# Patient Record
Sex: MALE | Race: BLACK OR AFRICAN AMERICAN
[De-identification: names, ages, dates, MRNs, and addresses within clinical notes are randomized per-mention and may not be internally consistent; named-entity substitution may affect disease eponyms.]

---

## 2019-07-24 ENCOUNTER — HOSPITAL ENCOUNTER (INPATIENT)
Dept: HOSPITAL 10 - FTE | Age: 24
LOS: 3 days | Discharge: HOME | DRG: 299 | End: 2019-07-27
Payer: MEDICAID

## 2019-07-24 ENCOUNTER — HOSPITAL ENCOUNTER (INPATIENT)
Dept: HOSPITAL 91 - FTE | Age: 24
LOS: 3 days | Discharge: HOME | DRG: 299 | End: 2019-07-27
Payer: MEDICAID

## 2019-07-24 VITALS
HEIGHT: 68 IN | BODY MASS INDEX: 20.65 KG/M2 | WEIGHT: 136.25 LBS | BODY MASS INDEX: 20.65 KG/M2 | HEIGHT: 68 IN | WEIGHT: 136.25 LBS

## 2019-07-24 VITALS — DIASTOLIC BLOOD PRESSURE: 70 MMHG | RESPIRATION RATE: 20 BRPM | SYSTOLIC BLOOD PRESSURE: 132 MMHG

## 2019-07-24 DIAGNOSIS — D68.59: ICD-10-CM

## 2019-07-24 DIAGNOSIS — I82.432: Primary | ICD-10-CM

## 2019-07-24 DIAGNOSIS — J43.9: ICD-10-CM

## 2019-07-24 DIAGNOSIS — I26.99: ICD-10-CM

## 2019-07-24 DIAGNOSIS — F12.90: ICD-10-CM

## 2019-07-24 DIAGNOSIS — I82.442: ICD-10-CM

## 2019-07-24 LAB
ADD MAN DIFF?: NO
ALANINE AMINOTRANSFERASE: 24 IU/L (ref 13–69)
ALBUMIN/GLOBULIN RATIO: 1.17
ALBUMIN: 4.7 G/DL (ref 3.3–4.9)
ALKALINE PHOSPHATASE: 72 IU/L (ref 42–121)
ANION GAP: 8 (ref 5–13)
ASPARTATE AMINO TRANSFERASE: 28 IU/L (ref 15–46)
BASOPHIL #: 0 10^3/UL (ref 0–0.1)
BASOPHILS %: 0.4 % (ref 0–2)
BILIRUBIN,DIRECT: 0 MG/DL (ref 0–0.2)
BILIRUBIN,TOTAL: 0.8 MG/DL (ref 0.2–1.3)
BLOOD UREA NITROGEN: 17 MG/DL (ref 7–20)
CALCIUM: 10.3 MG/DL (ref 8.4–10.2)
CARBON DIOXIDE: 33 MMOL/L (ref 21–31)
CHLORIDE: 99 MMOL/L (ref 97–110)
CREATININE: 1.18 MG/DL (ref 0.61–1.24)
EOSINOPHILS #: 0.1 10^3/UL (ref 0–0.5)
EOSINOPHILS %: 1.6 % (ref 0–7)
GLOBULIN: 4 G/DL (ref 1.3–3.2)
GLUCOSE: 85 MG/DL (ref 70–220)
HEMATOCRIT: 46.1 % (ref 42–52)
HEMOGLOBIN: 15.8 G/DL (ref 14–18)
IMMATURE GRANS #M: 0.02 10^3/UL (ref 0–0.03)
IMMATURE GRANS % (M): 0.3 % (ref 0–0.43)
INR: 1.09
LYMPHOCYTES #: 1.7 10^3/UL (ref 0.8–2.9)
LYMPHOCYTES %: 23.6 % (ref 15–51)
MEAN CORPUSCULAR HEMOGLOBIN: 29.4 PG (ref 29–33)
MEAN CORPUSCULAR HGB CONC: 34.3 G/DL (ref 32–37)
MEAN CORPUSCULAR VOLUME: 85.8 FL (ref 82–101)
MEAN PLATELET VOLUME: 10.7 FL (ref 7.4–10.4)
MONOCYTE #: 0.5 10^3/UL (ref 0.3–0.9)
MONOCYTES %: 7.1 % (ref 0–11)
NEUTROPHIL #: 4.8 10^3/UL (ref 1.6–7.5)
NEUTROPHILS %: 67 % (ref 39–77)
NUCLEATED RED BLOOD CELLS #: 0 10^3/UL (ref 0–0)
NUCLEATED RED BLOOD CELLS%: 0 /100WBC (ref 0–0)
PARTIAL THROMBOPLASTIN TIME: 33.7 SEC (ref 23–35)
PLATELET COUNT: 162 10^3/UL (ref 140–415)
POTASSIUM: 4.1 MMOL/L (ref 3.5–5.1)
PROTIME: 14.2 SEC (ref 11.9–14.9)
PT RATIO: 1.1
RED BLOOD COUNT: 5.37 10^6/UL (ref 4.7–6.1)
RED CELL DISTRIBUTION WIDTH: 12.1 % (ref 11.5–14.5)
SODIUM: 140 MMOL/L (ref 135–144)
TOTAL PROTEIN: 8.7 G/DL (ref 6.1–8.1)
WHITE BLOOD COUNT: 7.1 10^3/UL (ref 4.8–10.8)

## 2019-07-24 PROCEDURE — 81003 URINALYSIS AUTO W/O SCOPE: CPT

## 2019-07-24 PROCEDURE — 80048 BASIC METABOLIC PNL TOTAL CA: CPT

## 2019-07-24 PROCEDURE — 85613 RUSSELL VIPER VENOM DILUTED: CPT

## 2019-07-24 PROCEDURE — 83735 ASSAY OF MAGNESIUM: CPT

## 2019-07-24 PROCEDURE — 83890: CPT

## 2019-07-24 PROCEDURE — 85305 CLOT INHIBIT PROT S TOTAL: CPT

## 2019-07-24 PROCEDURE — 85025 COMPLETE CBC W/AUTO DIFF WBC: CPT

## 2019-07-24 PROCEDURE — 85610 PROTHROMBIN TIME: CPT

## 2019-07-24 PROCEDURE — 84100 ASSAY OF PHOSPHORUS: CPT

## 2019-07-24 PROCEDURE — 85730 THROMBOPLASTIN TIME PARTIAL: CPT

## 2019-07-24 PROCEDURE — 71275 CT ANGIOGRAPHY CHEST: CPT

## 2019-07-24 PROCEDURE — 93971 EXTREMITY STUDY: CPT

## 2019-07-24 PROCEDURE — 85300 ANTITHROMBIN III ACTIVITY: CPT

## 2019-07-24 PROCEDURE — 80053 COMPREHEN METABOLIC PANEL: CPT

## 2019-07-24 PROCEDURE — 85302 CLOT INHIBIT PROT C ANTIGEN: CPT

## 2019-07-24 PROCEDURE — 83036 HEMOGLOBIN GLYCOSYLATED A1C: CPT

## 2019-07-24 PROCEDURE — 84443 ASSAY THYROID STIM HORMONE: CPT

## 2019-07-24 PROCEDURE — G0378 HOSPITAL OBSERVATION PER HR: HCPCS

## 2019-07-24 RX ADMIN — IBUPROFEN 1 MG: 600 TABLET ORAL at 20:41

## 2019-07-24 NOTE — ERD
ER Documentation


Chief Complaint


Chief Complaint





LEFT LEG PAIN X1WK





HPI


This patient is a 24-year-old male with past mental history of DVT and pulmonary


embolism presenting to the emergency department complaining of left calf pain in


termittently for the past 1 week.  Pain is become constant over the past 2 days.


 Patient states the symptoms began after a long travel in a car.  He does not 


recall exactly how long he was in the car, however he does report up to 4 to 5 


hours in a car multiple times over the past 1 week.  He states he has had 4 DVTs


in his left lower extremity in the past and the symptoms feel the same.  He 


denies any shortness of breath, fevers, chills, abdominal pain, dizziness, 


syncope, or other symptoms at this time.  He tried no medication for relief of 


symptoms at home.





ROS


All systems reviewed and are negative except as per history of present illness.





Allergies


Allergies:  


Coded Allergies:  


     No Known Allergy (Unverified , 19)





PMhx/Soc


History of Surgery:  No


Anesthesia Reaction:  No


Hx Neurological Disorder:  No


Hx Respiratory Disorders:  Yes (PE)


Hx Cardiac Disorders:  No


Hx Psychiatric Problems:  No


Hx Miscellaneous Medical Probl:  Yes (DVT)


Hx Alcohol Use:  No


Hx Substance Use:  Yes (MARIJUANA)


Hx Tobacco Use:  Yes


Smoking Status:  Never smoker





FmHx


Family History:  No diabetes





Physical Exam


Vitals





Vital Signs


  Date      Temp  Pulse  Resp  B/P (MAP)   Pulse Ox  O2          O2 Flow    FiO2


Time                                                 Delivery    Rate


   19  98.9    104    19      124/75        97


     19:02                           (91)





Physical Exam


Const:   No acute distress


Head:   Atraumatic 


Eyes:    Normal Conjunctiva


ENT:    Normal External Ears, Nose and Mouth.


Neck:               Full range of motion. No meningismus.


Resp:   Clear to auscultation bilaterally


Cardio:   Regular rate and rhythm, no murmurs


Skin:   No petechiae or rashes


Ext:    No cyanosis, or edema, mild left calf tenderness on palpation.  Strength


and sensation is intact to the left lower extremity.  Difficulty bearing weight 


to the left lower extremity secondary to pain.


Neur:   Awake and alert


Psych:    Normal Mood and Affect


Result Diagram:  


19





Results 24 hrs





Current Medications


 Medications
   Dose
          Sig/Cory
       Start Time
   Status  Last


 (Trade)       Ordered        Route
 PRN     Stop Time              Admin
Dose


                              Reason                                Admin


 Ibuprofen
     600 mg         ONCE  ONCE
    19       DC           19


(Motrin)                      PO
            20:30
                       20:41



                                             19 20:31


 Morphine       4 mg           ONCE  STAT
    19       DC           19


Sulfate
                      IV
            21:10
                       21:24



(morphine)                                   19 21:12


[place] [id number]


                        Radiology Main Line: [phone]





                            DIAGNOSTIC IMAGING REPORT





Patient: ALEJANDRA DELVALLE   : 1995   Age: 24  Sex: M                      


 


       MR #:    [id number]   Acct #:   [id number]    DOS: 19 0000


Ordering MD: SHAR MARQUEZ PA-C   Location:  FTE   Room/Bed:             


                              


                                        


PROCEDURE:   US Lower extremity Venous. 


 


CLINICAL INDICATION:    Left calf pain 


 


TECHNIQUE:   Multiple sonographic images of the bilateral lower extremity deep 


venous system were obtained utilizing grayscale, color-flow, compressive 


sonography and doppler imaging with augmentation.  The images were reviewed on a


PACS workstation. 


 


COMPARISON:   Left 


 


FINDINGS:


There is normal compressibility and flow within the left common femoral or deep 


femoral vein. There is occlusive thrombus in the popliteal vein as well as the 


posterior tibial and peroneal veins.


 


IMPRESSION:


Acute deep venous thrombosis left popliteal, posterior tibial and peroneal 


veins.


_____________________________________________ 


.Chinmay Crystal MD, MD           Date    Time 


Electronically viewed and signed by .Chinmay Crystal MD, MD on 2019 


21:36 


 


D:  2019 21:36  T:  2019 21:36


.A/





CC: SHAR MARQUEZ PA-C





[id number]








Procedures/MDM


This is a pleasant 24-year-old male presenting with complaints of left calf pain


intermittently for the past 1 week.  Patient does have history of DVT and 


pulmonary embolism in the past.  He was nontoxic and well-appearing and in no 


acute distress.  Lung examination was within normal limits.  Patient did have 


left calf tenderness to palpation.  Ultrasound revealed DVT to the left lower 


extremity.  I did discuss patient case with attending ED physician, Dr. Izabel Solis he will commended admission for further treatment including anticoagula


tion therapy.  Patient was directly involved in his medical decision making and 


he agreed with plan for admission.  He remained hemodynamically stable under my 


direct care.





Departure


Diagnosis:  


   Primary Impression:  


   Left leg DVT


   Affected thrombotic vein of extremity:  popliteal  Chronicity:  acute  


   Qualified Codes:  I82.432 - Acute embolism and thrombosis of left popliteal 


   vein


   Additional Impression:  


   Pain of left leg


Condition:  Fair











SHAR MARQUEZ PA-C       2019 20:38

## 2019-07-24 NOTE — HP
Date/Time of Note


Date/Time of Note


DATE: 7/24/19 


TIME: 22:10





Assessment/Plan


VTE Prophylaxis


Pharmacological prophylaxis:  LMWH





Lines/Catheters


IV Catheter Type (from [facility]):  Peripheral IV





Assessment/Plan


Hospital Course


This is a 24-year-old male being admitted to the [facility] floor for:





#1 acute left lower extremity DVT: Patient has a history of hypercoagulable 


state with protein C/protein S and Antithrombin III deficiency.  Previously on 


Xarelto however he was not able to afford it and he also did not like how it 


made him feel.  Will initiate Lovenox therapeutic weight-based dose at the 


current time.  Will consult social work in regards to long-term anticoagulation 


plan.  Protein C/protein S Antithrombin lupus anticoagulant labs drawn prior to 


initiation of Lovenox.  Will consult Dr. Miller of hematology





2.  Shortness of breath: Patient does report occasional shortness of breath.  I 


will also order a CTA of the chest to rule out underlying pulmonary embolus.





3.  Hypercoagulable state: Patient has history of protein C/protein S and 


Antithrombin III deficiency.  Currently on Lovenox.  Will consult Dr. Miller 


with hematology pathology.





4. DVT GI prophylaxis: Therapeutic Lovenox, no GI prophylaxis indicated





Further treatment strategy will be implemented as per the clinical course


Result Diagram:  


7/24/19 2124





Results 24hrs





Laboratory Tests


               Test
                                7/24/19
21:24


               White Blood Count                            7.1


               Red Blood Count                             5.37


               Hemoglobin                                  15.8


               Hematocrit                                  46.1


               Mean Corpuscular Volume                     85.8


               Mean Corpuscular Hemoglobin                 29.4


               Mean Corpuscular Hemoglobin
Concent        34.3  



               Red Cell Distribution Width                 12.1


               Platelet Count                               162


               Mean Platelet Volume                       10.7  H


               Immature Granulocytes %                    0.300


               Neutrophils %                               67.0


               Lymphocytes %                               23.6


               Monocytes %                                  7.1


               Eosinophils %                                1.6


               Basophils %                                  0.4


               Nucleated Red Blood Cells %                  0.0


               Immature Granulocytes #                    0.020


               Neutrophils #                                4.8


               Lymphocytes #                                1.7


               Monocytes #                                  0.5


               Eosinophils #                                0.1


               Basophils #                                  0.0


               Nucleated Red Blood Cells #                  0.0


               Prothrombin Time                            14.2


               Prothrombin Time Ratio                       1.1


               INR International Normalized
Ratio         1.09  



               Activated Partial
Thromboplast Time        33.7  









HPI/ROS


Admit Date/Time


Admit Date/Time


Jul 24, 2019 at 21:21





Hx of Present Illness


Chief complaint: Left lower extremity calf pain x1 week





This is a 21-year-old male with a significant past medical history for multiple 


pulmonary embolism, DVT, protein C, S deficiencies, Antithrombin III deficiency 


Who presented to the emergency department with complaints of left lower 


extremity pain x1 week.  Patient is originally from [place]  But he has moved to


[place].  He states that the pain is been getting worse over the last 2 days.


 H he does report that over the last week or so he has been traveling in the 


car.  I did speak to the patient's mother over the phone who is in [place].  She


does report that the patient has a significant history of clotting which is in 


the family.  The mother has protein C/S and Antithrombin III deficiency as well.


 She is on Pradaxa.  She reports that her eldest son passed away from a heart 


attack secondary to him having blood clots as he also had hypercoagulable state.


 She reports that Nimesh was first diagnosed with clots in 2017.  He was on 


Xarelto for short period of time however secondary to insurance purposes as well


as noncompliance he has not continued on his medications.  Mother is concerned 


regarding his current situation and overall history of clotting.





Allergies: NKDA


Medications: None





ROS


Const: As per HPI


Eyes : No pain discharge or redness or change in visual acuity


ENT: No pain, sore throat, congestion, congestion,  dysphagia or discharge


Respiratory: As per HPI


Cardiovascular: No chest pain, palpitation, PND, or edema


GI : no change in appetite, abdominal pain, nausea, vomiting, diarrhea, 


constipation, or change in the color his stool 


Genitourinary: No dysuria, hematuria, flank pain ,  discharge or CVA tenderness


Musculoskeletal: As per HPI


Skin: No rash, bruising or hives 


Neuro: No headache, dizziness, syncope, seizure, focal weakness


Endocrine: No polyuria, polydipsia, temperature intolerance


Psych: No hallucination, depression, anxiety or suicidal ideation





PMH/Family/Social


Past Medical History


Multiple PEs/DVTs.  Protein C deficiency, protein S deficiency Antithrombin III 


deficiency


Medications





Current Medications


Ondansetron HCl (Zofran Inj) 4 mg BRIDGE ORDER PRN IV NAUSEA/VOMITING;  Start 7/ 24/19 at 21:30;  Stop 7/25/19 at 21:29


Acetaminophen (Tylenol Tab) 650 mg ER BRIDGE PRN PO .MILD PAIN 1-3 OR TEMP;  


Start 7/24/19 at 21:30;  Stop 7/25/19 at 21:29


IV Flush (NS 3 ml) 3 ml PER PROTOCOL IV ;  Start 7/24/19 at 21:30


Ondansetron HCl (Zofran Tab) 4 mg Q6H  PRN PO NAUSEA/VOMITING;  Start 7/24/19 at


21:30


Acetaminophen (Tylenol Tab) 650 mg Q6H  PRN PO .PAIN 1-3 OR TEMP;  Start 7/24/19


at 21:30


Acetaminophen/ Hydrocodone Bitart (Norco (5/325)) 1 tab Q6H  PRN PO .MOD PAIN 4-


6;  Start 7/24/19 at 21:30


Morphine Sulfate (morphine) 2 mg Q4H  PRN IV .SEVERE PAIN 7-10;  Start 7/24/19 


at 21:30


Docusate Sodium (Colace) 100 mg Q12H  PRN PO .CONSTIPATION;  Start 7/24/19 at 


21:30


Bisacodyl (Dulcolax) 5 mg DAILY  PRN PO .CONSTIPATION;  Start 7/24/19 at 21:30


Coded Allergies:  


     No Known Allergy (Unverified , 7/24/19)





Past Surgical History


Past Surgical Hx:  no surgical history





Family History


Significant Family History:  no pertinent family hx





Social History


Alcohol Use:  occasionally


Smoking Status:  Never smoker


Drug Use:  marijuana





Exam/Review of Systems


Vital Signs


Vitals





Vital Signs


  Date      Temp  Pulse  Resp  B/P (MAP)   Pulse Ox  O2          O2 Flow    FiO2


Time                                                 Delivery    Rate


   7/24/19  97.7     68    16      116/77       100  Room Air


     21:34                           (90)








Exam


Exam





General: Patient is a pleasant male currently lying in bed in no acute distress,


he is currently on his phone


HEENT: Atraumatic, normocephalic. The pupils are equal, round and reactive. 


Extraocular motor are intact


Neck: Supple with full range of motion. No rigidity or meningismus


Chest: Nontender


Lungs: Clear to auscultation bilaterally no crackles rales or wheezing


Heart: Normal S1-S2, Regular rhythm and rate. No murmur, S3, or S4


Abdomen: Soft , nontender,  nondistended , bowel sounds are present. No guarding


no rebound tenderness , No masses or organomegaly. No costovertebral temporal 


angle mass


Extremities: Mild calf tenderness palpation over the left lower extremity


Neurologic: Normal mental status, speech normal, cranial nerves II through XII 


are intact, motor and sensory are intact,


Additional Comments


PROCEDURE:   US Lower extremity Venous. 


 


CLINICAL INDICATION:    Left calf pain 


 


TECHNIQUE:   Multiple sonographic images of the bilateral lower extremity deep 


venous system were obtained utilizing grayscale, color-flow, compressive 


sonography and doppler imaging with augmentation.  The images were reviewed on a


PACS workstation. 


 


COMPARISON:   Left 


 


FINDINGS:


There is normal compressibility and flow within the left common femoral or deep 


femoral vein. There is occlusive thrombus in the popliteal vein as well as the 


posterior tibial and peroneal veins.


 


IMPRESSION:


Acute deep venous thrombosis left popliteal, posterior tibial and peroneal 


veins.


_____________________________________________ 


.Chinmay Crystal MD, MD           Date    Time 


Electronically viewed and signed by .Chinmay Crystal MD, MD on 07/24/2019 


21:36 


 


D:  07/24/2019 21:36  T:  07/24/2019 21:36


.A/





CC: SHAR MARQUEZ PA-C





[id number]











MAURIZIO MCNEAL                 Jul 24, 2019 22:10

## 2019-07-25 VITALS — DIASTOLIC BLOOD PRESSURE: 53 MMHG | SYSTOLIC BLOOD PRESSURE: 110 MMHG | HEART RATE: 52 BPM | RESPIRATION RATE: 20 BRPM

## 2019-07-25 VITALS — HEART RATE: 63 BPM | SYSTOLIC BLOOD PRESSURE: 116 MMHG | DIASTOLIC BLOOD PRESSURE: 58 MMHG | RESPIRATION RATE: 18 BRPM

## 2019-07-25 VITALS — SYSTOLIC BLOOD PRESSURE: 127 MMHG | HEART RATE: 75 BPM | DIASTOLIC BLOOD PRESSURE: 68 MMHG | RESPIRATION RATE: 18 BRPM

## 2019-07-25 VITALS — RESPIRATION RATE: 20 BRPM | SYSTOLIC BLOOD PRESSURE: 108 MMHG | DIASTOLIC BLOOD PRESSURE: 75 MMHG

## 2019-07-25 LAB
ADD MAN DIFF?: NO
ADD UMIC: NO
ALANINE AMINOTRANSFERASE: 23 IU/L (ref 13–69)
ALBUMIN/GLOBULIN RATIO: 1.24
ALBUMIN: 4.1 G/DL (ref 3.3–4.9)
ALKALINE PHOSPHATASE: 76 IU/L (ref 42–121)
ANION GAP: 10 (ref 5–13)
ASPARTATE AMINO TRANSFERASE: 33 IU/L (ref 15–46)
BASOPHIL #: 0 10^3/UL (ref 0–0.1)
BASOPHILS %: 0.3 % (ref 0–2)
BILIRUBIN,DIRECT: 0 MG/DL (ref 0–0.2)
BILIRUBIN,TOTAL: 0.6 MG/DL (ref 0.2–1.3)
BLOOD UREA NITROGEN: 19 MG/DL (ref 7–20)
CALCIUM: 9.3 MG/DL (ref 8.4–10.2)
CARBON DIOXIDE: 29 MMOL/L (ref 21–31)
CHLORIDE: 102 MMOL/L (ref 97–110)
CREATININE: 1 MG/DL (ref 0.61–1.24)
EOSINOPHILS #: 0.2 10^3/UL (ref 0–0.5)
EOSINOPHILS %: 2.2 % (ref 0–7)
GLOBULIN: 3.3 G/DL (ref 1.3–3.2)
GLUCOSE: 102 MG/DL (ref 70–220)
HEMATOCRIT: 42.4 % (ref 42–52)
HEMOGLOBIN A1C: 5 % (ref 0–5.9)
HEMOGLOBIN: 14.5 G/DL (ref 14–18)
IMMATURE GRANS #M: 0.02 10^3/UL (ref 0–0.03)
IMMATURE GRANS % (M): 0.3 % (ref 0–0.43)
LYMPHOCYTES #: 3.4 10^3/UL (ref 0.8–2.9)
LYMPHOCYTES %: 43.9 % (ref 15–51)
MAGNESIUM: 2 MG/DL (ref 1.7–2.5)
MEAN CORPUSCULAR HEMOGLOBIN: 29.5 PG (ref 29–33)
MEAN CORPUSCULAR HGB CONC: 34.2 G/DL (ref 32–37)
MEAN CORPUSCULAR VOLUME: 86.4 FL (ref 82–101)
MEAN PLATELET VOLUME: 11.1 FL (ref 7.4–10.4)
MONOCYTE #: 0.6 10^3/UL (ref 0.3–0.9)
MONOCYTES %: 7.3 % (ref 0–11)
NEUTROPHIL #: 3.6 10^3/UL (ref 1.6–7.5)
NEUTROPHILS %: 46 % (ref 39–77)
NUCLEATED RED BLOOD CELLS #: 0 10^3/UL (ref 0–0)
NUCLEATED RED BLOOD CELLS%: 0 /100WBC (ref 0–0)
PLATELET COUNT: 154 10^3/UL (ref 140–415)
POTASSIUM: 3.6 MMOL/L (ref 3.5–5.1)
RED BLOOD COUNT: 4.91 10^6/UL (ref 4.7–6.1)
RED CELL DISTRIBUTION WIDTH: 12.3 % (ref 11.5–14.5)
SODIUM: 141 MMOL/L (ref 135–144)
THYROID STIMULATING HORMONE: 4.21 MIU/L (ref 0.47–4.68)
TOTAL PROTEIN: 7.4 G/DL (ref 6.1–8.1)
UR ASCORBIC ACID: NEGATIVE MG/DL
UR BILIRUBIN (DIP): NEGATIVE MG/DL
UR BLOOD (DIP): NEGATIVE MG/DL
UR CLARITY: CLEAR
UR COLOR: YELLOW
UR GLUCOSE (DIP): NEGATIVE MG/DL
UR KETONES (DIP): NEGATIVE MG/DL
UR LEUKOCYTE ESTERASE (DIP): NEGATIVE LEU/UL
UR NITRITE (DIP): NEGATIVE MG/DL
UR PH (DIP): 6 (ref 5–9)
UR SPECIFIC GRAVITY (DIP): 1.03 (ref 1–1.03)
UR TOTAL PROTEIN (DIP): NEGATIVE MG/DL
UR UROBILINOGEN (DIP): (no result) MG/DL
WHITE BLOOD COUNT: 7.8 10^3/UL (ref 4.8–10.8)

## 2019-07-25 RX ADMIN — ENOXAPARIN SODIUM SCH MG: 100 INJECTION SUBCUTANEOUS at 00:48

## 2019-07-25 RX ADMIN — ENOXAPARIN SODIUM 1 MG: 100 INJECTION SUBCUTANEOUS at 09:11

## 2019-07-25 RX ADMIN — MORPHINE SULFATE 1 MG: 2 INJECTION, SOLUTION INTRAMUSCULAR; INTRAVENOUS at 04:31

## 2019-07-25 RX ADMIN — HYDROCODONE BITARTRATE AND ACETAMINOPHEN PRN TAB: 5; 325 TABLET ORAL at 20:39

## 2019-07-25 RX ADMIN — DOCUSATE SODIUM 1 MG: 100 CAPSULE, LIQUID FILLED ORAL at 23:06

## 2019-07-25 RX ADMIN — ENOXAPARIN SODIUM 1 MG: 100 INJECTION SUBCUTANEOUS at 00:48

## 2019-07-25 RX ADMIN — MORPHINE SULFATE PRN MG: 2 INJECTION, SOLUTION INTRAMUSCULAR; INTRAVENOUS at 04:31

## 2019-07-25 RX ADMIN — ENOXAPARIN SODIUM 1 MG: 100 INJECTION SUBCUTANEOUS at 20:33

## 2019-07-25 RX ADMIN — MORPHINE SULFATE PRN MG: 2 INJECTION, SOLUTION INTRAMUSCULAR; INTRAVENOUS at 09:15

## 2019-07-25 RX ADMIN — HYDROCODONE BITARTRATE AND ACETAMINOPHEN 1 TAB: 5; 325 TABLET ORAL at 01:19

## 2019-07-25 RX ADMIN — ENOXAPARIN SODIUM SCH MG: 100 INJECTION SUBCUTANEOUS at 20:33

## 2019-07-25 RX ADMIN — MORPHINE SULFATE 1 MG: 2 INJECTION, SOLUTION INTRAMUSCULAR; INTRAVENOUS at 14:11

## 2019-07-25 RX ADMIN — ENOXAPARIN SODIUM SCH MG: 100 INJECTION SUBCUTANEOUS at 09:11

## 2019-07-25 RX ADMIN — HYDROCODONE BITARTRATE AND ACETAMINOPHEN 1 TAB: 5; 325 TABLET ORAL at 20:39

## 2019-07-25 RX ADMIN — MORPHINE SULFATE PRN MG: 2 INJECTION, SOLUTION INTRAMUSCULAR; INTRAVENOUS at 14:11

## 2019-07-25 RX ADMIN — MORPHINE SULFATE 1 MG: 2 INJECTION, SOLUTION INTRAMUSCULAR; INTRAVENOUS at 09:15

## 2019-07-25 RX ADMIN — HYDROCODONE BITARTRATE AND ACETAMINOPHEN PRN TAB: 5; 325 TABLET ORAL at 01:19

## 2019-07-25 NOTE — CONS
Assessment/Plan


Assessment/Plan


Assessment/Plan (Daily)


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 


He has been on Xarelto but stopped it


for financial reasons and not because of side effects.  I will see if I can get 


samples for him but in the meantime, he is getting Lovenox.


Case discussed with patient and with mother by phone.  She will send me the lab 


results from his screening done as a teenager.





Consultation Date/Type/Reason


Admit Date/Time


2019 at 21:21


Date of Consultation:  2019


Type of Consult


Hematology


Reason for Consultation


DVT and PE


Requesting Provider:  MAURIZIO MCNEAL


Date/Time of Note


DATE: 19 


TIME: 14:14





Hx of Present Illness


23 yo man with at least four episodes of DVT, the first of which was while he 


was a teenager.  He was diagnosed before the first episode


because his mother had a stroke and was found to have thrombophilic mutations in


.  Also a brother  of an MI at a young age.  The patient 


and the mother both have mutations of Protein S, Protein C and AT III. 





This admission was for left calf area pain found to be a DVT and to be 


associated with PE.





Past Medical History


Medications





Current Medications


IV Flush (NS 3 ml) 3 ml PER PROTOCOL IV ;  Start 19 at 21:30


Ondansetron HCl (Zofran Tab) 4 mg Q6H  PRN PO NAUSEA/VOMITING;  Start 19 at


21:30


Acetaminophen (Tylenol Tab) 650 mg Q6H  PRN PO .PAIN 1-3 OR TEMP;  Start 19


at 21:30


Acetaminophen/ Hydrocodone Bitart (Norco (5/325)) 1 tab Q6H  PRN PO .MOD PAIN 4-


6 Last administered on 19at 01:19; Admin Dose 1 TAB;  Start 19 at 


21:30


Morphine Sulfate (morphine) 2 mg Q4H  PRN IV .SEVERE PAIN 7-10 Last administered


on 19at 14:11; Admin Dose 2 MG;  Start 19 at 21:30


Docusate Sodium (Colace) 100 mg Q12H  PRN PO .CONSTIPATION;  Start 19 at 2


1:30


Bisacodyl (Dulcolax) 5 mg DAILY  PRN PO .CONSTIPATION;  Start 19 at 21:30


Enoxaparin Sodium (Lovenox) 60 mg Q12 SC  Last administered on 19at 09:11; 


Admin Dose 60 MG;  Start 19 at 23:30


Allergies:  


Coded Allergies:  


     No Known Allergy (Unverified , 19)





Past Surgical History


Past Surgical Hx:  no surgical history





Social History


Alcohol Use:  occasionally


Smoking Status:  Never smoker


Drug Use:  marijuana





Exam/Review of Systems


Exam


Vitals





Vital Signs


  Date      Temp  Pulse  Resp  B/P (MAP)   Pulse Ox  O2          O2 Flow    FiO2


Time                                                 Delivery    Rate


   19  98.2     63    18      116/58        98  Room Air


     07:47                           (77)








Intake and Output





19





1515:00


23:00


07:00





IntakeIntake Total


240 ml





OutputOutput Total


500 ml





BalanceBalance


-260 ml











Constitutional:  alert, oriented, well developed


Head:  normocephalic


Eyes:  nl conjunctiva


ENMT:  nl external ears & nose


Neck:  supple


Respiratory:  clear to auscultation


Cardiovascular:  regular rate and rhythm


Gastrointestinal:  soft, non-tender


Extremities:  other (moderate pain of the left calf and popliteal area but no 


edema or cord.)


Lymph:  nl lymph nodes





Results


Result Diagram:  


19 0421                                                                    


           19 0421





Results 24hrs





Laboratory Tests


Test
                                19
21:24  19
22:00  19
04:21


White Blood Count                            7.1                           7.8


Red Blood Count                             5.37                          4.91


Hemoglobin                                  15.8                          14.5


Hematocrit                                  46.1                          42.4


Mean Corpuscular Volume                     85.8                          86.4


Mean Corpuscular Hemoglobin                 29.4                          29.5


Mean Corpuscular Hemoglobin
Concent        34.3  
  
                    34.2  



Red Cell Distribution Width                 12.1                          12.3


Platelet Count                               162                           154


Mean Platelet Volume                       10.7  H                       11.1  H


Immature Granulocytes %                    0.300                         0.300


Neutrophils %                               67.0                          46.0


Lymphocytes %                               23.6                          43.9


Monocytes %                                  7.1                           7.3


Eosinophils %                                1.6                           2.2


Basophils %                                  0.4                           0.3


Nucleated Red Blood Cells %                  0.0                           0.0


Immature Granulocytes #                    0.020                         0.020


Neutrophils #                                4.8                           3.6


Lymphocytes #                                1.7                          3.4  H


Monocytes #                                  0.5                           0.6


Eosinophils #                                0.1                           0.2


Basophils #                                  0.0                           0.0


Nucleated Red Blood Cells #                  0.0                           0.0


Prothrombin Time                            14.2


Prothrombin Time Ratio                       1.1


INR International Normalized
Ratio         1.09  
  
              



Activated Partial
Thromboplast Time        33.7  
  
              



Sodium Level                                 140                           141


Potassium Level                              4.1                           3.6


Chloride Level                                99                           102


Carbon Dioxide Level                         33  H                          29


Anion Gap                                      8                            10


Blood Urea Nitrogen                           17                            19


Creatinine                                  1.18                          1.00


Est Glomerular Filtrat Rate
mL/min   > 60  
        
              > 60  



Glucose Level                                 85                           102


Calcium Level                              10.3  H                         9.3


Total Bilirubin                              0.8                           0.6


Direct Bilirubin                            0.00                          0.00


Indirect Bilirubin                           0.8                           0.6


Aspartate Amino Transf
(AST/SGOT)            28  
  
                      33  



Alanine Aminotransferase
(ALT/SGPT)          24  
  
                      23  



Alkaline Phosphatase                          72                            76


Total Protein                               8.7  H                        7.4  #


Albumin                                      4.7                           4.1


Globulin                                   4.00  H                       3.30  H


Albumin/Globulin Ratio                      1.17                          1.24


Urine Color                                         YELLOW


Urine Clarity                                       CLEAR


Urine pH                                                    6.0


Urine Specific Gravity                                    1.027


Urine Ketones                                       NEGATIVE


Urine Nitrite                                       NEGATIVE


Urine Bilirubin                                     NEGATIVE


Urine Urobilinogen                                          1+  H


Urine Leukocyte Esterase                            NEGATIVE


Urine Hemoglobin                                    NEGATIVE


Urine Glucose                                       NEGATIVE


Urine Total Protein                                 NEGATIVE


Hemoglobin A1c                                                             5.0


Magnesium Level                                                            2.0


Thyroid Stimulating Hormone
(TSH)    
              
                   4.210  









Medications


Medication





Current Medications


IV Flush (NS 3 ml) 3 ml PER PROTOCOL IV ;  Start 19 at 21:30


Ondansetron HCl (Zofran Tab) 4 mg Q6H  PRN PO NAUSEA/VOMITING;  Start 19 at


21:30


Acetaminophen (Tylenol Tab) 650 mg Q6H  PRN PO .PAIN 1-3 OR TEMP;  Start 19


at 21:30


Acetaminophen/ Hydrocodone Bitart (Norco (5/325)) 1 tab Q6H  PRN PO .MOD PAIN 4-


6 Last administered on 19at 01:19; Admin Dose 1 TAB;  Start 19 at 


21:30


Morphine Sulfate (morphine) 2 mg Q4H  PRN IV .SEVERE PAIN 7-10 Last administered


on 19at 14:11; Admin Dose 2 MG;  Start 19 at 21:30


Docusate Sodium (Colace) 100 mg Q12H  PRN PO .CONSTIPATION;  Start 19 at 


21:30


Bisacodyl (Dulcolax) 5 mg DAILY  PRN PO .CONSTIPATION;  Start 19 at 21:30


Enoxaparin Sodium (Lovenox) 60 mg Q12 SC  Last administered on 19at 09:11; 


Admin Dose 60 MG;  Start 19 at 23:30











LUKE MALDONADO MD            2019 14:24

## 2019-07-25 NOTE — PN
Date/Time of Note


Date/Time of Note


DATE: 7/25/19 


TIME: 10:43





Assessment/Plan


VTE Prophylaxis


SCD applied (from Nsg):  No


SCD contraindicated:  other


Pharmacological prophylaxis:  LMWH





Lines/Catheters


IV Catheter Type (from Nrs):  Saline Lock





Assessment/Plan


Hospital Course


SUBJECTIVE:


Denies any complaints.





OBJECTIVE:


Physical Exam


General: Adequately build 24 year-old male lying in bed in no apparent distress.


HEENT: Normocephalic, atraumatic. Eyes: Anicteric sclerae, conjunctivae clear. 


ENT: Nasal septum midline, oral mucosa moist. Neck supple, no JVD noticed.


Respiratory: Bilaterally clear breath sounds. No use of accessory muscles of 


respiration. No adventitious breath sounds.


Cardiovascular: S1, S2 heard. Regular rate and rhythm.


Abdomen: Soft, nontender, and nondistended. Bowel sounds positive in all 4 


quadrants.


Genitourinary: Deferred.


Extremities: No cyanosis, no clubbing, no edema. Peripheral pulses palpable.


Neurologic: Cranial nerves II through XII grossly intact. The patient is awake, 


alert, and oriented.


Skin: Normal skin turgor. No skin rashes.





Labs & Vitals per chart





ASSESSMENT & PLAN





24-year-old male with past medical history of multiple pulmonary embolism, DVTs,


protein C and protein S deficiency, and Antithrombin III deficiency who 


presented to the emergency department with chief complaint of left lower 


extremity pain, who was found to have evidence of acute DVT of the left 


popliteal, posterior tibial, and peroneal veins with CTA showing multiple 


bilateral pulmonary emboli, who was admitted to inpatient setting for further 


treatment and evaluation.





1.  Bilateral pulmonary emboli.


The patient on therapeutic Lovenox.


Pending hematology evaluation.





2.  Acute DVT of the left lower extremity.


Continue therapeutic Lovenox.





3.  Hypercoagulable state.


Pending hematology evaluation.  


Needs recommendation for the best option for this patient (patient's family 


requesting for IVC filter placement).





4.  Emphysematous changes in the lungs.


Smoking cessation.





5.  Fluids, electrolytes, and nutrition.


Regular diet.





6.  DVT prophylaxis.


On therapeutic Lovenox.





7.  Plan.


Continue therapeutic Lovenox.


Await hematology evaluation.


Case management aware of the patient's need for long-term anticoagulation 


(patient has no health insurance as of now).





The patient was seen in collaboration with Dr. Hassan.


Result Diagram:  


7/25/19 0421                                                                    


           7/25/19 0421





Results 24hrs





Laboratory Tests


Test
                                7/24/19
21:24  7/24/19
22:00  7/25/19
04:21


White Blood Count                            7.1                           7.8


Red Blood Count                             5.37                          4.91


Hemoglobin                                  15.8                          14.5


Hematocrit                                  46.1                          42.4


Mean Corpuscular Volume                     85.8                          86.4


Mean Corpuscular Hemoglobin                 29.4                          29.5


Mean Corpuscular Hemoglobin
Concent        34.3  
  
                    34.2  



Red Cell Distribution Width                 12.1                          12.3


Platelet Count                               162                           154


Mean Platelet Volume                       10.7  H                       11.1  H


Immature Granulocytes %                    0.300                         0.300


Neutrophils %                               67.0                          46.0


Lymphocytes %                               23.6                          43.9


Monocytes %                                  7.1                           7.3


Eosinophils %                                1.6                           2.2


Basophils %                                  0.4                           0.3


Nucleated Red Blood Cells %                  0.0                           0.0


Immature Granulocytes #                    0.020                         0.020


Neutrophils #                                4.8                           3.6


Lymphocytes #                                1.7                          3.4  H


Monocytes #                                  0.5                           0.6


Eosinophils #                                0.1                           0.2


Basophils #                                  0.0                           0.0


Nucleated Red Blood Cells #                  0.0                           0.0


Prothrombin Time                            14.2


Prothrombin Time Ratio                       1.1


INR International Normalized
Ratio         1.09  
  
              



Activated Partial
Thromboplast Time        33.7  
  
              



Sodium Level                                 140                           141


Potassium Level                              4.1                           3.6


Chloride Level                                99                           102


Carbon Dioxide Level                         33  H                          29


Anion Gap                                      8                            10


Blood Urea Nitrogen                           17                            19


Creatinine                                  1.18                          1.00


Est Glomerular Filtrat Rate
mL/min   > 60  
        
              > 60  



Glucose Level                                 85                           102


Calcium Level                              10.3  H                         9.3


Total Bilirubin                              0.8                           0.6


Direct Bilirubin                            0.00                          0.00


Indirect Bilirubin                           0.8                           0.6


Aspartate Amino Transf
(AST/SGOT)            28  
  
                      33  



Alanine Aminotransferase
(ALT/SGPT)          24  
  
                      23  



Alkaline Phosphatase                          72                            76


Total Protein                               8.7  H                        7.4  #


Albumin                                      4.7                           4.1


Globulin                                   4.00  H                       3.30  H


Albumin/Globulin Ratio                      1.17                          1.24


Urine Color                                         YELLOW


Urine Clarity                                       CLEAR


Urine pH                                                    6.0


Urine Specific Gravity                                    1.027


Urine Ketones                                       NEGATIVE


Urine Nitrite                                       NEGATIVE


Urine Bilirubin                                     NEGATIVE


Urine Urobilinogen                                          1+  H


Urine Leukocyte Esterase                            NEGATIVE


Urine Hemoglobin                                    NEGATIVE


Urine Glucose                                       NEGATIVE


Urine Total Protein                                 NEGATIVE


Hemoglobin A1c                                                             5.0


Magnesium Level                                                            2.0


Thyroid Stimulating Hormone
(TSH)    
              
                   4.210  









Exam/Review of Systems


Exam


Vitals





Vital Signs


  Date      Temp  Pulse  Resp  B/P (MAP)   Pulse Ox  O2          O2 Flow    FiO2


Time                                                 Delivery    Rate


   7/25/19  98.2     63    18      116/58        98  Room Air


     07:47                           (77)








Intake and Output





7/24/19 7/24/19 7/25/19





1515:00


23:00


07:00





IntakeIntake Total


240 ml





OutputOutput Total


500 ml





BalanceBalance


-260 ml














Results


Results 24hrs





Laboratory Tests


Test
                                7/24/19
21:24  7/24/19
22:00  7/25/19
04:21


White Blood Count                            7.1                           7.8


Red Blood Count                             5.37                          4.91


Hemoglobin                                  15.8                          14.5


Hematocrit                                  46.1                          42.4


Mean Corpuscular Volume                     85.8                          86.4


Mean Corpuscular Hemoglobin                 29.4                          29.5


Mean Corpuscular Hemoglobin
Concent        34.3  
  
                    34.2  



Red Cell Distribution Width                 12.1                          12.3


Platelet Count                               162                           154


Mean Platelet Volume                       10.7  H                       11.1  H


Immature Granulocytes %                    0.300                         0.300


Neutrophils %                               67.0                          46.0


Lymphocytes %                               23.6                          43.9


Monocytes %                                  7.1                           7.3


Eosinophils %                                1.6                           2.2


Basophils %                                  0.4                           0.3


Nucleated Red Blood Cells %                  0.0                           0.0


Immature Granulocytes #                    0.020                         0.020


Neutrophils #                                4.8                           3.6


Lymphocytes #                                1.7                          3.4  H


Monocytes #                                  0.5                           0.6


Eosinophils #                                0.1                           0.2


Basophils #                                  0.0                           0.0


Nucleated Red Blood Cells #                  0.0                           0.0


Prothrombin Time                            14.2


Prothrombin Time Ratio                       1.1


INR International Normalized
Ratio         1.09  
  
              



Activated Partial
Thromboplast Time        33.7  
  
              



Sodium Level                                 140                           141


Potassium Level                              4.1                           3.6


Chloride Level                                99                           102


Carbon Dioxide Level                         33  H                          29


Anion Gap                                      8                            10


Blood Urea Nitrogen                           17                            19


Creatinine                                  1.18                          1.00


Est Glomerular Filtrat Rate
mL/min   > 60  
        
              > 60  



Glucose Level                                 85                           102


Calcium Level                              10.3  H                         9.3


Total Bilirubin                              0.8                           0.6


Direct Bilirubin                            0.00                          0.00


Indirect Bilirubin                           0.8                           0.6


Aspartate Amino Transf
(AST/SGOT)            28  
  
                      33  



Alanine Aminotransferase
(ALT/SGPT)          24  
  
                      23  



Alkaline Phosphatase                          72                            76


Total Protein                               8.7  H                        7.4  #


Albumin                                      4.7                           4.1


Globulin                                   4.00  H                       3.30  H


Albumin/Globulin Ratio                      1.17                          1.24


Urine Color                                         YELLOW


Urine Clarity                                       CLEAR


Urine pH                                                    6.0


Urine Specific Gravity                                    1.027


Urine Ketones                                       NEGATIVE


Urine Nitrite                                       NEGATIVE


Urine Bilirubin                                     NEGATIVE


Urine Urobilinogen                                          1+  H


Urine Leukocyte Esterase                            NEGATIVE


Urine Hemoglobin                                    NEGATIVE


Urine Glucose                                       NEGATIVE


Urine Total Protein                                 NEGATIVE


Hemoglobin A1c                                                             5.0


Magnesium Level                                                            2.0


Thyroid Stimulating Hormone
(TSH)    
              
                   4.210  









Medications


Medication





Current Medications


Ondansetron HCl (Zofran Inj) 4 mg BRIDGE ORDER PRN IV NAUSEA/VOMITING;  Start 


7/24/19 at 21:30;  Stop 7/25/19 at 21:29


Acetaminophen (Tylenol Tab) 650 mg ER BRIDGE PRN PO .MILD PAIN 1-3 OR TEMP;  


Start 7/24/19 at 21:30;  Stop 7/25/19 at 21:29


IV Flush (NS 3 ml) 3 ml PER PROTOCOL IV ;  Start 7/24/19 at 21:30


Ondansetron HCl (Zofran Tab) 4 mg Q6H  PRN PO NAUSEA/VOMITING;  Start 7/24/19 at


21:30


Acetaminophen (Tylenol Tab) 650 mg Q6H  PRN PO .PAIN 1-3 OR TEMP;  Start 7/24/19


at 21:30


Acetaminophen/ Hydrocodone Bitart (Norco (5/325)) 1 tab Q6H  PRN PO .MOD PAIN 4-


6 Last administered on 7/25/19at 01:19; Admin Dose 1 TAB;  Start 7/24/19 at 


21:30


Morphine Sulfate (morphine) 2 mg Q4H  PRN IV .SEVERE PAIN 7-10 Last administered


on 7/25/19at 09:15; Admin Dose 2 MG;  Start 7/24/19 at 21:30


Docusate Sodium (Colace) 100 mg Q12H  PRN PO .CONSTIPATION;  Start 7/24/19 at 


21:30


Bisacodyl (Dulcolax) 5 mg DAILY  PRN PO .CONSTIPATION;  Start 7/24/19 at 21:30


Enoxaparin Sodium (Lovenox) 60 mg Q12 SC  Last administered on 7/25/19at 09:11; 


Admin Dose 60 MG;  Start 7/24/19 at 23:30











FABI HILLS NP               Jul 25, 2019 10:44

## 2019-07-26 VITALS — RESPIRATION RATE: 18 BRPM | SYSTOLIC BLOOD PRESSURE: 111 MMHG | DIASTOLIC BLOOD PRESSURE: 61 MMHG | HEART RATE: 56 BPM

## 2019-07-26 VITALS — HEART RATE: 58 BPM | DIASTOLIC BLOOD PRESSURE: 58 MMHG | RESPIRATION RATE: 18 BRPM | SYSTOLIC BLOOD PRESSURE: 104 MMHG

## 2019-07-26 VITALS — RESPIRATION RATE: 17 BRPM | SYSTOLIC BLOOD PRESSURE: 117 MMHG | DIASTOLIC BLOOD PRESSURE: 60 MMHG | HEART RATE: 60 BPM

## 2019-07-26 VITALS — HEART RATE: 60 BPM | RESPIRATION RATE: 18 BRPM | DIASTOLIC BLOOD PRESSURE: 53 MMHG | SYSTOLIC BLOOD PRESSURE: 110 MMHG

## 2019-07-26 LAB
ADD MAN DIFF?: NO
ANION GAP: 9 (ref 5–13)
BASOPHIL #: 0 10^3/UL (ref 0–0.1)
BASOPHILS %: 0.5 % (ref 0–2)
BLOOD UREA NITROGEN: 14 MG/DL (ref 7–20)
CALCIUM: 9.6 MG/DL (ref 8.4–10.2)
CARBON DIOXIDE: 28 MMOL/L (ref 21–31)
CHLORIDE: 102 MMOL/L (ref 97–110)
CREATININE: 0.98 MG/DL (ref 0.61–1.24)
EOSINOPHILS #: 0.2 10^3/UL (ref 0–0.5)
EOSINOPHILS %: 2.7 % (ref 0–7)
GLUCOSE: 113 MG/DL (ref 70–220)
HEMATOCRIT: 44.1 % (ref 42–52)
HEMOGLOBIN: 15.3 G/DL (ref 14–18)
IMMATURE GRANS #M: 0.02 10^3/UL (ref 0–0.03)
IMMATURE GRANS % (M): 0.4 % (ref 0–0.43)
LYMPHOCYTES #: 2.8 10^3/UL (ref 0.8–2.9)
LYMPHOCYTES %: 49 % (ref 15–51)
MAGNESIUM: 2 MG/DL (ref 1.7–2.5)
MEAN CORPUSCULAR HEMOGLOBIN: 29.6 PG (ref 29–33)
MEAN CORPUSCULAR HGB CONC: 34.7 G/DL (ref 32–37)
MEAN CORPUSCULAR VOLUME: 85.3 FL (ref 82–101)
MEAN PLATELET VOLUME: 10.9 FL (ref 7.4–10.4)
MONOCYTE #: 0.5 10^3/UL (ref 0.3–0.9)
MONOCYTES %: 8.5 % (ref 0–11)
NEUTROPHIL #: 2.2 10^3/UL (ref 1.6–7.5)
NEUTROPHILS %: 38.9 % (ref 39–77)
NUCLEATED RED BLOOD CELLS #: 0 10^3/UL (ref 0–0)
NUCLEATED RED BLOOD CELLS%: 0 /100WBC (ref 0–0)
PHOSPHORUS: 4.6 MG/DL (ref 2.5–4.9)
PLATELET COUNT: 164 10^3/UL (ref 140–415)
POTASSIUM: 3.8 MMOL/L (ref 3.5–5.1)
RED BLOOD COUNT: 5.17 10^6/UL (ref 4.7–6.1)
RED CELL DISTRIBUTION WIDTH: 12.2 % (ref 11.5–14.5)
SODIUM: 139 MMOL/L (ref 135–144)
WHITE BLOOD COUNT: 5.6 10^3/UL (ref 4.8–10.8)

## 2019-07-26 RX ADMIN — ENOXAPARIN SODIUM 1 MG: 100 INJECTION SUBCUTANEOUS at 08:15

## 2019-07-26 RX ADMIN — MORPHINE SULFATE PRN MG: 2 INJECTION, SOLUTION INTRAMUSCULAR; INTRAVENOUS at 21:01

## 2019-07-26 RX ADMIN — MORPHINE SULFATE PRN MG: 2 INJECTION, SOLUTION INTRAMUSCULAR; INTRAVENOUS at 08:03

## 2019-07-26 RX ADMIN — MORPHINE SULFATE PRN MG: 2 INJECTION, SOLUTION INTRAMUSCULAR; INTRAVENOUS at 01:49

## 2019-07-26 RX ADMIN — RIVAROXABAN SCH MG: 15 TABLET, FILM COATED ORAL at 17:09

## 2019-07-26 RX ADMIN — MORPHINE SULFATE 1 MG: 2 INJECTION, SOLUTION INTRAMUSCULAR; INTRAVENOUS at 17:10

## 2019-07-26 RX ADMIN — RIVAROXABAN 1 MG: 15 TABLET, FILM COATED ORAL at 17:09

## 2019-07-26 RX ADMIN — ENOXAPARIN SODIUM SCH MG: 100 INJECTION SUBCUTANEOUS at 08:15

## 2019-07-26 RX ADMIN — MORPHINE SULFATE 1 MG: 2 INJECTION, SOLUTION INTRAMUSCULAR; INTRAVENOUS at 01:49

## 2019-07-26 RX ADMIN — MORPHINE SULFATE 1 MG: 2 INJECTION, SOLUTION INTRAMUSCULAR; INTRAVENOUS at 12:51

## 2019-07-26 RX ADMIN — MORPHINE SULFATE PRN MG: 2 INJECTION, SOLUTION INTRAMUSCULAR; INTRAVENOUS at 12:51

## 2019-07-26 RX ADMIN — MORPHINE SULFATE 1 MG: 2 INJECTION, SOLUTION INTRAMUSCULAR; INTRAVENOUS at 08:03

## 2019-07-26 RX ADMIN — MORPHINE SULFATE PRN MG: 2 INJECTION, SOLUTION INTRAMUSCULAR; INTRAVENOUS at 17:10

## 2019-07-26 RX ADMIN — MORPHINE SULFATE 1 MG: 2 INJECTION, SOLUTION INTRAMUSCULAR; INTRAVENOUS at 21:01

## 2019-07-26 NOTE — PN
Date/Time of Note


Date/Time of Note


DATE: 7/26/19 


TIME: 11:02





Assessment/Plan


VTE Prophylaxis


Risk score (from Ns)>0 risk:  9


[place] [facility] [place]


SCD contraindicated:  DVT


Pharmacological prophylaxis:  LMWH





Lines/Catheters


IV Catheter Type (from [facility]):  Saline Lock


Urinary Cath still in place:  No





Assessment/Plan


Assessment/Plan


Pt is continuing on Lovenox for the acute DVT and PE.  He had previously done 


well on Xarelto and is used to taking it.  He stopped it for financial reasons.


I was able to get samples of the 20 mg tablets (#28) that I gave to him today 


for use when he goes home.  His mother said yesterday that he will likely have 


insurance in November.  I will try to get additional samples in August for him.


Result Diagram:  


7/26/19 0419                                                                    


           7/26/19 0419





Results 24hrs





Laboratory Tests


               Test
                                7/26/19
04:19


               White Blood Count                           5.6  #


               Red Blood Count                             5.17


               Hemoglobin                                  15.3


               Hematocrit                                  44.1


               Mean Corpuscular Volume                     85.3


               Mean Corpuscular Hemoglobin                 29.6


               Mean Corpuscular Hemoglobin
Concent        34.7  



               Red Cell Distribution Width                 12.2


               Platelet Count                               164


               Mean Platelet Volume                       10.9  H


               Immature Granulocytes %                    0.400


               Neutrophils %                              38.9  L


               Lymphocytes %                               49.0


               Monocytes %                                  8.5


               Eosinophils %                                2.7


               Basophils %                                  0.5


               Nucleated Red Blood Cells %                  0.0


               Immature Granulocytes #                    0.020


               Neutrophils #                                2.2


               Lymphocytes #                                2.8


               Monocytes #                                  0.5


               Eosinophils #                                0.2


               Basophils #                                  0.0


               Nucleated Red Blood Cells #                  0.0


               Sodium Level                                 139


               Potassium Level                              3.8


               Chloride Level                               102


               Carbon Dioxide Level                          28


               Anion Gap                                      9


               Blood Urea Nitrogen                           14


               Creatinine                                  0.98


               Est Glomerular Filtrat Rate
mL/min   > 60  



               Glucose Level                                113


               Calcium Level                                9.6


               Phosphorus Level                             4.6


               Magnesium Level                              2.0








Subjective


24 Hr Interval Summary


Free Text/Dictation


Pt says that he is feeling better but pain in the left leg has not fully gone 


away.





Exam/Review of Systems


Exam


Vitals





Vital Signs


  Date      Temp  Pulse  Resp  B/P (MAP)   Pulse Ox  O2          O2 Flow    FiO2


Time                                                 Delivery    Rate


   7/26/19  98.0     60    18      110/53        98  Room Air


     07:55                           (72)








Intake and Output





7/25/19 7/25/19 7/26/19





1515:00


23:00


07:00





IntakeIntake Total


240 ml


700 ml





BalanceBalance


240 ml


700 ml











Constitutional:  alert, oriented


Head:  normocephalic


Eyes:  nl conjunctiva


ENMT:  nl external ears & nose


Neck:  supple


Respiratory:  clear to auscultation


Cardiovascular:  regular rate and rhythm


Gastrointestinal:  soft, non-tender


Extremities:  edema (no edema but moderate tenderness of the left calf area)


Lymph:  nl lymph nodes





Results


Results 24hrs





Laboratory Tests


               Test
                                7/26/19
04:19


               White Blood Count                           5.6  #


               Red Blood Count                             5.17


               Hemoglobin                                  15.3


               Hematocrit                                  44.1


               Mean Corpuscular Volume                     85.3


               Mean Corpuscular Hemoglobin                 29.6


               Mean Corpuscular Hemoglobin
Concent        34.7  



               Red Cell Distribution Width                 12.2


               Platelet Count                               164


               Mean Platelet Volume                       10.9  H


               Immature Granulocytes %                    0.400


               Neutrophils %                              38.9  L


               Lymphocytes %                               49.0


               Monocytes %                                  8.5


               Eosinophils %                                2.7


               Basophils %                                  0.5


               Nucleated Red Blood Cells %                  0.0


               Immature Granulocytes #                    0.020


               Neutrophils #                                2.2


               Lymphocytes #                                2.8


               Monocytes #                                  0.5


               Eosinophils #                                0.2


               Basophils #                                  0.0


               Nucleated Red Blood Cells #                  0.0


               Sodium Level                                 139


               Potassium Level                              3.8


               Chloride Level                               102


               Carbon Dioxide Level                          28


               Anion Gap                                      9


               Blood Urea Nitrogen                           14


               Creatinine                                  0.98


               Est Glomerular Filtrat Rate
mL/min   > 60  



               Glucose Level                                113


               Calcium Level                                9.6


               Phosphorus Level                             4.6


               Magnesium Level                              2.0








Medications


Medication





Current Medications


IV Flush (NS 3 ml) 3 ml PER PROTOCOL IV ;  Start 7/24/19 at 21:30


Ondansetron HCl (Zofran Tab) 4 mg Q6H  PRN PO NAUSEA/VOMITING;  Start 7/24/19 at


21:30


Acetaminophen (Tylenol Tab) 650 mg Q6H  PRN PO .PAIN 1-3 OR TEMP;  Start 7/24/19


at 21:30


Acetaminophen/ Hydrocodone Bitart (Norco (5/325)) 1 tab Q6H  PRN PO .MOD PAIN 4-


6 Last administered on 7/25/19at 20:39; Admin Dose 1 TAB;  Start 7/24/19 at 


21:30


Morphine Sulfate (morphine) 2 mg Q4H  PRN IV .SEVERE PAIN 7-10 Last administered


on 7/26/19at 08:03; Admin Dose 2 MG;  Start 7/24/19 at 21:30


Docusate Sodium (Colace) 100 mg Q12H  PRN PO .CONSTIPATION Last administered on 


7/25/19at 23:06; Admin Dose 100 MG;  Start 7/24/19 at 21:30


Bisacodyl (Dulcolax) 5 mg DAILY  PRN PO .CONSTIPATION;  Start 7/24/19 at 21:30


Enoxaparin Sodium (Lovenox) 60 mg Q12 SC  Last administered on 7/26/19at 08:15; 


Admin Dose 60 MG;  Start 7/24/19 at 23:30











LUKE MALDONADO MD            Jul 26, 2019 11:07

## 2019-07-26 NOTE — PN
Date/Time of Note


Date/Time of Note


DATE: 7/26/19 


TIME: 09:15





Assessment/Plan


VTE Prophylaxis


Risk score (from Ns)>0 risk:  9


SCD applied (from Ns):  No


SCD contraindicated:  other


Pharmacological prophylaxis:  LMWH





Lines/Catheters


IV Catheter Type (from [facility]):  Saline Lock


Urinary Cath still in place:  No





Assessment/Plan


Hospital Course


SUBJECTIVE:


Denies any complaints.





OBJECTIVE:


Physical Exam


General: Adequately build 24 year-old male lying in bed in no apparent distress.


HEENT: Normocephalic, atraumatic. Eyes: Anicteric sclerae, conjunctivae clear. 


ENT: Nasal septum midline, oral mucosa moist. Neck supple, no JVD noticed.


Respiratory: Bilaterally clear breath sounds. No use of accessory muscles of 


respiration. No adventitious breath sounds.


Cardiovascular: S1, S2 heard. Regular rate and rhythm.


Abdomen: Soft, nontender, and nondistended. Bowel sounds positive in all 4 


quadrants.


Genitourinary: Deferred.


Extremities: No cyanosis, no clubbing, no edema. Peripheral pulses palpable.


Neurologic: Cranial nerves II through XII grossly intact. The patient is awake, 


alert, and oriented.


Skin: Normal skin turgor. No skin rashes.





Labs & Vitals per chart





ASSESSMENT & PLAN





24-year-old male with past medical history of multiple pulmonary embolism, DVTs,


protein C and protein S deficiency, and Antithrombin III deficiency who 


presented to the emergency department with chief complaint of left lower 


extremity pain, who was found to have evidence of acute DVT of the left 


popliteal, posterior tibial, and peroneal veins with CTA showing multiple 


bilateral pulmonary emboli, who was admitted to inpatient setting for further 


treatment and evaluation.





1.  Bilateral pulmonary emboli.


The patient on therapeutic Lovenox.


Appreciate hematology evaluation.





2.  Acute DVT of the left lower extremity.


Continue therapeutic Lovenox.





3.  Hypercoagulable state.


Appreciate hematology inputs.





4.  Emphysematous changes in the lungs.


Smoking cessation.





5.  Fluids, electrolytes, and nutrition.


Regular diet.





6.  DVT prophylaxis.


On therapeutic Lovenox.





7.  Plan.


Continue therapeutic Lovenox.


Case management aware of the patient's need for long-term anticoagulation 


(patient has no health insurance as of now).





The patient was seen in collaboration with Dr. Hassan.


Result Diagram:  


7/26/19 0419                                                                    


           7/26/19 0419





Results 24hrs





Laboratory Tests


               Test
                                7/26/19
04:19


               White Blood Count                           5.6  #


               Red Blood Count                             5.17


               Hemoglobin                                  15.3


               Hematocrit                                  44.1


               Mean Corpuscular Volume                     85.3


               Mean Corpuscular Hemoglobin                 29.6


               Mean Corpuscular Hemoglobin
Concent        34.7  



               Red Cell Distribution Width                 12.2


               Platelet Count                               164


               Mean Platelet Volume                       10.9  H


               Immature Granulocytes %                    0.400


               Neutrophils %                              38.9  L


               Lymphocytes %                               49.0


               Monocytes %                                  8.5


               Eosinophils %                                2.7


               Basophils %                                  0.5


               Nucleated Red Blood Cells %                  0.0


               Immature Granulocytes #                    0.020


               Neutrophils #                                2.2


               Lymphocytes #                                2.8


               Monocytes #                                  0.5


               Eosinophils #                                0.2


               Basophils #                                  0.0


               Nucleated Red Blood Cells #                  0.0


               Sodium Level                                 139


               Potassium Level                              3.8


               Chloride Level                               102


               Carbon Dioxide Level                          28


               Anion Gap                                      9


               Blood Urea Nitrogen                           14


               Creatinine                                  0.98


               Est Glomerular Filtrat Rate
mL/min   > 60  



               Glucose Level                                113


               Calcium Level                                9.6


               Phosphorus Level                             4.6


               Magnesium Level                              2.0








Exam/Review of Systems


Exam


Vitals





Vital Signs


  Date      Temp  Pulse  Resp  B/P (MAP)   Pulse Ox  O2          O2 Flow    FiO2


Time                                                 Delivery    Rate


   7/26/19  98.0     60    18      110/53        98  Room Air


     07:55                           (72)








Intake and Output





7/25/19 7/25/19 7/26/19





1515:00


23:00


07:00





IntakeIntake Total


240 ml


700 ml





BalanceBalance


240 ml


700 ml














Results


Results 24hrs





Laboratory Tests


               Test
                                7/26/19
04:19


               White Blood Count                           5.6  #


               Red Blood Count                             5.17


               Hemoglobin                                  15.3


               Hematocrit                                  44.1


               Mean Corpuscular Volume                     85.3


               Mean Corpuscular Hemoglobin                 29.6


               Mean Corpuscular Hemoglobin
Concent        34.7  



               Red Cell Distribution Width                 12.2


               Platelet Count                               164


               Mean Platelet Volume                       10.9  H


               Immature Granulocytes %                    0.400


               Neutrophils %                              38.9  L


               Lymphocytes %                               49.0


               Monocytes %                                  8.5


               Eosinophils %                                2.7


               Basophils %                                  0.5


               Nucleated Red Blood Cells %                  0.0


               Immature Granulocytes #                    0.020


               Neutrophils #                                2.2


               Lymphocytes #                                2.8


               Monocytes #                                  0.5


               Eosinophils #                                0.2


               Basophils #                                  0.0


               Nucleated Red Blood Cells #                  0.0


               Sodium Level                                 139


               Potassium Level                              3.8


               Chloride Level                               102


               Carbon Dioxide Level                          28


               Anion Gap                                      9


               Blood Urea Nitrogen                           14


               Creatinine                                  0.98


               Est Glomerular Filtrat Rate
mL/min   > 60  



               Glucose Level                                113


               Calcium Level                                9.6


               Phosphorus Level                             4.6


               Magnesium Level                              2.0








Medications


Medication





Current Medications


IV Flush (NS 3 ml) 3 ml PER PROTOCOL IV ;  Start 7/24/19 at 21:30


Ondansetron HCl (Zofran Tab) 4 mg Q6H  PRN PO NAUSEA/VOMITING;  Start 7/24/19 at


21:30


Acetaminophen (Tylenol Tab) 650 mg Q6H  PRN PO .PAIN 1-3 OR TEMP;  Start 7/24/19


at 21:30


Acetaminophen/ Hydrocodone Bitart (Norco (5/325)) 1 tab Q6H  PRN PO .MOD PAIN 4-


6 Last administered on 7/25/19at 20:39; Admin Dose 1 TAB;  Start 7/24/19 at 


21:30


Morphine Sulfate (morphine) 2 mg Q4H  PRN IV .SEVERE PAIN 7-10 Last administered


on 7/26/19at 08:03; Admin Dose 2 MG;  Start 7/24/19 at 21:30


Docusate Sodium (Colace) 100 mg Q12H  PRN PO .CONSTIPATION Last administered on 


7/25/19at 23:06; Admin Dose 100 MG;  Start 7/24/19 at 21:30


Bisacodyl (Dulcolax) 5 mg DAILY  PRN PO .CONSTIPATION;  Start 7/24/19 at 21:30


Enoxaparin Sodium (Lovenox) 60 mg Q12 SC  Last administered on 7/26/19at 08:15; 


Admin Dose 60 MG;  Start 7/24/19 at 23:30











FABI HILLS NP               Jul 26, 2019 09:16

## 2019-07-27 VITALS — SYSTOLIC BLOOD PRESSURE: 103 MMHG | HEART RATE: 65 BPM | RESPIRATION RATE: 18 BRPM | DIASTOLIC BLOOD PRESSURE: 60 MMHG

## 2019-07-27 VITALS — SYSTOLIC BLOOD PRESSURE: 125 MMHG | HEART RATE: 52 BPM | DIASTOLIC BLOOD PRESSURE: 56 MMHG | RESPIRATION RATE: 18 BRPM

## 2019-07-27 LAB
ANTI-THROMBIN III: 15 MG/DL (ref 19–30)
PROTEIN C: 131 % NORMAL (ref 70–180)

## 2019-07-27 RX ADMIN — VASOPRESSIN 1: 20 INJECTION, SOLUTION INTRAMUSCULAR; SUBCUTANEOUS at 03:40

## 2019-07-27 RX ADMIN — SODIUM CHLORIDE 1 ML: 9 INJECTION, SOLUTION INTRAMUSCULAR; INTRAVENOUS; SUBCUTANEOUS at 03:40

## 2019-07-27 RX ADMIN — MORPHINE SULFATE 1 MG: 2 INJECTION, SOLUTION INTRAMUSCULAR; INTRAVENOUS at 01:23

## 2019-07-27 RX ADMIN — HYDROCODONE BITARTRATE AND ACETAMINOPHEN 1 TAB: 5; 325 TABLET ORAL at 10:26

## 2019-07-27 RX ADMIN — MORPHINE SULFATE 1 MG: 2 INJECTION, SOLUTION INTRAMUSCULAR; INTRAVENOUS at 05:34

## 2019-07-27 RX ADMIN — MORPHINE SULFATE PRN MG: 2 INJECTION, SOLUTION INTRAMUSCULAR; INTRAVENOUS at 05:34

## 2019-07-27 RX ADMIN — RIVAROXABAN 1 MG: 15 TABLET, FILM COATED ORAL at 08:18

## 2019-07-27 RX ADMIN — HYDROCODONE BITARTRATE AND ACETAMINOPHEN PRN TAB: 5; 325 TABLET ORAL at 10:26

## 2019-07-27 RX ADMIN — MORPHINE SULFATE PRN MG: 2 INJECTION, SOLUTION INTRAMUSCULAR; INTRAVENOUS at 01:23

## 2019-07-27 RX ADMIN — IOHEXOL 1: 350 INJECTION, SOLUTION INTRAVENOUS at 03:41

## 2019-07-27 RX ADMIN — RIVAROXABAN SCH MG: 15 TABLET, FILM COATED ORAL at 08:18

## 2019-07-27 NOTE — DS
Date/Time of Note


Date/Time of Note


DATE: 19 


TIME: 10:15





Discharge Summary


Admission/Discharge Info


Admit Date/Time


2019 at 23:16


Discharge Date/Time





Discharge Diagnosis


1.  Bilateral pulmonary emboli.





2.  Acute DVT of the left lower extremity.





3.  Hypercoagulable state.





4.  Emphysematous changes in the lungs.





5.  Marijuana use.


Patient Condition:  Stable


Consults


1. Humza Hodge MD, Hematology.


Procedures


                          [place] [id number]


                        Radiology Main Line: [phone]





                            DIAGNOSTIC IMAGING REPORT





Patient: ALEJANDRA DELVALLE   : 1995   Age: 24  Sex: M                      


 


       MR #:    [id number]   Acct #:   [id number]    DOS: 19 0000


Ordering MD: MAURIZIO MCNEAL MD   [place]  [facility] [place]   Room/Bed:  [facility]             


                            


                                        


PROCEDURE:   CTA Chest 


 


CLINICAL INDICATION: Dyspnea


 


TECHNIQUE:   CTA of the chest with 100 cc Omnipaque-350 IV contrast.  Coronal, 


sagittal and 3-D volume rendered reformatted images were obtained. One or more 


of the following dose reduction techniques were used: automated exposure 


control, adjustment of the mA and/or kV according to patient size, use of 


iterative reconstruction technique. DICOM images are available. CTDI 7.8 mGy, 


 mGy-cm.


 


COMPARISON:   No prior studies are available for comparison. 


 


FINDINGS:


Lungs:  Mild pulmonary emphysema. No acute infiltrate, pleural effusion or pne


umothorax.  No pulmonary nodule or mass.


Cardiovascular:  Multiple bilateral pulmonary emboli are identified. No central 


saddle embolus is seen. Normal heart size. Right/left ventricular ratio is 0.9, 


within normal limits.  No thoracic aortic aneurysm or dissection. 


Lymph nodes: No lymphadenopathy.


Mediastinum:  No mediastinal mass.


Upper abdomen:  Unremarkable.


Musculoskeletal:  Unremarkable.


                  


IMPRESSION:


1.  Multiple bilateral pulmonary emboli. No central saddle embolus.


2.  Mild pulmonary emphysema.


3.  No mass, lymphadenopathy, or acute infiltrate.


Hx of Present Illness


This is a 24-year-old male with past medical history of multiple pulmonary 


embolism, DVTs, protein C and protein S deficiency, and Antithrombin III 


deficiency who presented to the emergency department with chief complaint of 


left lower extremity pain, who was found to have evidence of acute DVT of the 


left popliteal, posterior tibial, and peroneal veins with CTA showing multiple 


bilateral pulmonary emboli, who was admitted to inpatient setting for further 


treatment and evaluation.


Hospital Course





The patient was admitted to inpatient setting.  He was started on 


anticoagulation with Lovenox.  The patient has known history of protein C, 


protein S, and Antithrombin III deficiency and prior history of multiple 


pulmonary embolisms and DVTs.  He was not on anticoagulation because of 


financial problems.  The patient was also recently moved from [place] and is in 


the process of getting insurance in [place].  The patient was evaluated by 


hematology and recommended anticoagulation.  The patient was switched to 


Xarelto.  The patient was given multiple sample bottles for Xarelto since he 


currently has no insurance.  The patient was started on 15 mg p.o. twice daily 


and he already got 2 doses.  The patient was given coupons for 15 mg twice daily


to complete the course of 21 days followed by 20 mg nightly.





The patient is otherwise a relatively healthy male with no other significant 


comorbidities.  The patient was incidentally noticed to have emphysematous 


changes in his lungs.  The patient was advised on smoking cessation.  The p


atient had a stable hospital course.  The patient is stable to be discharged 


home.





Discharge Instructions


1. Take medications as per prescription. Never skip taking medications.


2. Take a regular diet.


3. Resume activities as tolerated. 


4. Please follow-up with your PCP in 2 weeks.


5. Please go to the nearest ER if you have any chest pain or shortness of jaswant


th.





The patient verbalized understanding of his discharge instructions.





At this time I would like to thank Dr. Hodge for seeing the patient and 


providing clinical recommendations.





The patient was seen in collaboration with Dr. Padron.


Home Meds


Active Scripts


Rivaroxaban* (Xarelto*) 20 Mg Tablet, 20 MG PO WITH DINNER for 90 Days, #90 TAB


   Patient jesse has supplies.


   Prov:FABI HILLS NP         19


Rivaroxaban* (Xarelto*) 15 Mg Tablet, 15 MG PO BID WITH MEALS for 20 Days, #40 


TAB


   Prov:FABI HILLS NP         19


Follow-up Plan


Follow-up with your primary care physician in 2 weeks.


Primary Care Provider


Care Physician No Primary











FABI HILLS NP               2019 10:18

## 2019-07-27 NOTE — PDOCDIS
Discharge Instructions


CONDITION


                [id number]
Patient Condition:  [id number]
Stable








HOME CARE INSTRUCTIONS:


                [id number]
Diet Instructions:  [id number]
Regular








FOLLOW UP/APPOINTMENTS


Follow-up Plan


Follow-up with your primary care physician in 2 weeks.





OTHER ORDERS:


Other Orders:


1. Take medications as per prescription. Never skip taking medications.


2. Take a regular diet.


3. Resume activities as tolerated. 


4. Please follow-up with your PCP in 2 weeks.


5. Please go to the nearest ER if you have any chest pain or shortness of br


eath.











FABI HILLS NP               Jul 27, 2019 10:14